# Patient Record
Sex: MALE | Race: BLACK OR AFRICAN AMERICAN | URBAN - METROPOLITAN AREA
[De-identification: names, ages, dates, MRNs, and addresses within clinical notes are randomized per-mention and may not be internally consistent; named-entity substitution may affect disease eponyms.]

---

## 2018-02-28 ENCOUNTER — HOSPITAL ENCOUNTER (OUTPATIENT)
Dept: SLEEP MEDICINE | Age: 53
Discharge: HOME | End: 2018-02-28
Payer: COMMERCIAL

## 2018-02-28 DIAGNOSIS — G47.33 OSA (OBSTRUCTIVE SLEEP APNEA): ICD-10-CM

## 2018-02-28 PROCEDURE — 95806 SLEEP STUDY UNATT&RESP EFFT: CPT

## 2018-02-28 NOTE — PROGRESS NOTES
· Patient arrived for sleep study. · Physician orders were reviewed. · Patient education to include initiation of PAP therapy per protocol. · Patient questions were answered. · The patient demonstrated good understanding of the positive airway pressure device.     Ht: 5'5\"  Wt: 291.6  B/P: 133/72  STOP-BAN/8  [place]:

## 2018-02-28 NOTE — PROGRESS NOTES
Patient Instructions  Should you need assistance after arriving home before 4:00 p.m. on a week day:  please call the sleep center at [phone]. After hours or week-end:     Please page the on-call Technologist at [phone]  When you are ready for bed and to begin the study, please follow these instructions:    1. Apply the device and components as demonstrated on the instruction sheet provided. 2. Apply respiratory belt and device around chest, located at nipple line and tighten to a snug fit. 3. Attach the nasal cannula filter to the port on the side of device and then place prongs in the nostrils and around ears, tightening the slider for a more secure fit. Be sure to use a medical tape to secure the plastic tubing to the patients cheeks. 4. Place the pulse Ox probe on the pointer figure. 5. Press the on button located on the device to initiate recording. Note 3 corners (refer to patient instructions) of the device should have a green light which will illuminate if sensors are functioning properly. 6. On the Home Study Activity Log (located below), document the date and time you are starting your study. Activity should be In Bed. 7. Remember 8-9 hours of recording (bed-time) with at least 6 hours of sleep is necessary in effort to capture as much valid data as possible is needed. To end the Home Sleep Test:  1. When you wake up for the day, record on the study log date/time (a.m. /p.m.) that you woke up. 2. Gently remove all sensors  3.  Return it to the case provided  Return the unit in its entirety to the sleep center as discussed with the technologist.

## 2018-03-01 ENCOUNTER — HOSPITAL ENCOUNTER (OUTPATIENT)
Dept: SLEEP MEDICINE | Age: 53
Discharge: HOME | End: 2018-03-01
Payer: COMMERCIAL